# Patient Record
Sex: FEMALE | Race: WHITE | NOT HISPANIC OR LATINO | Employment: FULL TIME | URBAN - METROPOLITAN AREA
[De-identification: names, ages, dates, MRNs, and addresses within clinical notes are randomized per-mention and may not be internally consistent; named-entity substitution may affect disease eponyms.]

---

## 2019-04-09 ENCOUNTER — OFFICE VISIT (OUTPATIENT)
Dept: NEUROSURGERY | Facility: CLINIC | Age: 64
End: 2019-04-09

## 2019-04-09 VITALS
DIASTOLIC BLOOD PRESSURE: 62 MMHG | SYSTOLIC BLOOD PRESSURE: 112 MMHG | WEIGHT: 120 LBS | BODY MASS INDEX: 24.19 KG/M2 | TEMPERATURE: 98.3 F | HEIGHT: 59 IN

## 2019-04-09 DIAGNOSIS — M51.36 DEGENERATIVE DISC DISEASE, LUMBAR: Primary | ICD-10-CM

## 2019-04-09 PROCEDURE — 99243 OFF/OP CNSLTJ NEW/EST LOW 30: CPT | Performed by: NEUROLOGICAL SURGERY

## 2019-04-09 RX ORDER — CELECOXIB 100 MG/1
CAPSULE ORAL
COMMUNITY
Start: 2019-03-28

## 2019-04-09 RX ORDER — MONTELUKAST SODIUM 10 MG/1
TABLET ORAL
COMMUNITY
Start: 2019-03-28

## 2019-04-09 RX ORDER — GABAPENTIN 300 MG/1
CAPSULE ORAL
COMMUNITY
Start: 2019-03-07

## 2019-04-09 NOTE — PROGRESS NOTES
[facility]  1955  [id number]      Chief Complaint   Patient presents with   • Back Pain   • Leg Pain     LLE   • Numbness     LLE       HISTORY OF PRESENT ILLNESS: This is a 64-year-old female who has a long history of symptoms consistent with degenerative osteoarthritis.  She has had a right hip replaced, fracture of her right wrist and has pain in the back rating to the left lower extremity.  This is been refractory to physical therapy, medications and chiropractic.  MRIs been performed and she is referred for neurosurgical consultation.    Past Medical History:   Diagnosis Date   • Anemia    • Aneurysm (CMS/HCC)    • Arthritis    • Headache        Past Surgical History:   Procedure Laterality Date   • HAND SURGERY Bilateral     Thumb       Family History   Problem Relation Age of Onset   • Heart disease Father    • Diabetes Father    • Stroke Father    • Diabetes Sister    • Heart disease Sister    • Cancer Brother        Social History     Socioeconomic History   • Marital status:      Spouse name: Not on file   • Number of children: Not on file   • Years of education: Not on file   • Highest education level: Not on file   Tobacco Use   • Smoking status: Never Smoker   • Smokeless tobacco: Never Used   Substance and Sexual Activity   • Alcohol use: No     Frequency: Never   • Drug use: No   • Sexual activity: Defer       No Known Allergies      Current Outpatient Medications:   •  celecoxib (CeleBREX) 100 MG capsule, , Disp: , Rfl:   •  gabapentin (NEURONTIN) 300 MG capsule, , Disp: , Rfl:   •  montelukast (SINGULAIR) 10 MG tablet, , Disp: , Rfl:     Review of Systems   HENT: Positive for rhinorrhea and sinus pressure.    Musculoskeletal: Positive for arthralgias, back pain, myalgias and neck pain.   Neurological: Positive for weakness and headaches.   Psychiatric/Behavioral: The patient is nervous/anxious.        Vitals:    04/09/19 1139   BP: 112/62   BP Location: Left arm   Patient  "Position: Sitting   Temp: 98.3 °F (36.8 °C)   TempSrc: Temporal   Weight: 54.4 kg (120 lb)   Height: 149.9 cm (59\")       Neurological Examination:    Mental status/speech: The patient is alert and oriented.  Speech is clear without aphysia or dysarthria.  No overt cognitive deficits.    Cranial nerve examination:    Olfaction: Smell is intact.  Vision: Vision is intact without visual field abnormalities.  Funduscopic examination is normal.  No pupillary irregularity.  Ocular motor examination: The extraocular muscles are intact.  There is no diplopia.  The pupil is round and reactive to both light and accommodation.  There is no nystagmus.  Facial movement/sensation: There is no facial weakness.  Sensation is intact in the first, second, and third divisions of the trigeminal nerve.  The corneal reflex is intact.  Auditory: Hearing is intact to finger rub bilaterally.  Cranial nerves IX, X, XI, XII: Phonation is normal.  No dysphagia.  Tongue is protruded in the midline without atrophy.  The gag reflex is intact.  Shoulder shrug is normal.    Musculoligamentous ligamentous examination: Straight leg raising produces pain in her left hip.  Her strength is intact without weakness or sensory loss.  She is sitting in a wheelchair because of pain in the lumbosacral region.    Medical Decision Making:     Diagnostic Data Set: The lumbar MRI shows multilevel degenerative disc disease however she does not have high-grade spinal stenosis or lateral recess stenosis.      Assessment: Advanced diffuse degenerative osteoarthritis of the lumbar spine, osteopenia          Recommendations: She has had a bone density study which also shows the presence of osteopenia superimposed upon degenerative osteoarthritis.  Unfortunately this is nonoperable and there is no \"fix\" other than managing her symptoms.  The preferred dose of Celebrex is 200 mg twice daily.  I have told her to increase his Celebrex she is taking at this time.  There is " little else to offer her.  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  If I can help further please let me know.        I greatly appreciate the opportunity to see and evaluate this individual.  If you have questions or concerns regarding issues that I may have overlooked please call me at any time: [phone].  Magen Hill M.D.  [facility]  [address] [place] [address]    Scribed for Eliseo Hill MD by Kate Saldana CMA. 4/9/2019 12:00 PM     I have read and concur with the information provided by the scribe.  Eliseo Hill MD